# Patient Record
Sex: FEMALE | Race: WHITE | NOT HISPANIC OR LATINO | Employment: FULL TIME | URBAN - NONMETROPOLITAN AREA
[De-identification: names, ages, dates, MRNs, and addresses within clinical notes are randomized per-mention and may not be internally consistent; named-entity substitution may affect disease eponyms.]

---

## 2018-03-08 ENCOUNTER — TRANSCRIBE ORDERS (OUTPATIENT)
Dept: ADMINISTRATIVE | Facility: HOSPITAL | Age: 33
End: 2018-03-08

## 2018-03-08 DIAGNOSIS — Z12.39 SCREENING BREAST EXAMINATION: Primary | ICD-10-CM

## 2018-04-05 ENCOUNTER — HOSPITAL ENCOUNTER (OUTPATIENT)
Dept: MAMMOGRAPHY | Facility: HOSPITAL | Age: 33
Discharge: HOME | End: 2018-04-05
Admitting: ADVANCED PRACTICE MIDWIFE

## 2018-04-05 DIAGNOSIS — Z12.39 SCREENING BREAST EXAMINATION: ICD-10-CM

## 2018-04-05 PROCEDURE — 77063 BREAST TOMOSYNTHESIS BI: CPT

## 2018-04-05 PROCEDURE — 77067 SCR MAMMO BI INCL CAD: CPT

## 2018-04-05 PROCEDURE — 77063 BREAST TOMOSYNTHESIS BI: CPT | Performed by: RADIOLOGY

## 2018-04-05 PROCEDURE — 77067 SCR MAMMO BI INCL CAD: CPT | Performed by: RADIOLOGY

## 2018-04-11 ENCOUNTER — HOSPITAL ENCOUNTER (OUTPATIENT)
Dept: ULTRASOUND IMAGING | Facility: HOSPITAL | Age: 33
Discharge: HOME | End: 2018-04-11

## 2018-04-11 ENCOUNTER — HOSPITAL ENCOUNTER (OUTPATIENT)
Dept: MAMMOGRAPHY | Facility: HOSPITAL | Age: 33
Discharge: HOME | End: 2018-04-11
Admitting: RADIOLOGY

## 2018-04-11 DIAGNOSIS — R92.8 ABNORMAL MAMMOGRAM: ICD-10-CM

## 2018-04-11 PROCEDURE — G0279 TOMOSYNTHESIS, MAMMO: HCPCS

## 2018-04-11 PROCEDURE — 76642 ULTRASOUND BREAST LIMITED: CPT

## 2018-04-11 PROCEDURE — 77065 DX MAMMO INCL CAD UNI: CPT | Performed by: RADIOLOGY

## 2018-04-11 PROCEDURE — 76642 ULTRASOUND BREAST LIMITED: CPT | Performed by: RADIOLOGY

## 2018-04-11 PROCEDURE — 77061 BREAST TOMOSYNTHESIS UNI: CPT | Performed by: RADIOLOGY

## 2018-04-11 PROCEDURE — 77065 DX MAMMO INCL CAD UNI: CPT

## 2018-04-12 ENCOUNTER — APPOINTMENT (OUTPATIENT)
Dept: MAMMOGRAPHY | Facility: HOSPITAL | Age: 33
End: 2018-04-12

## 2018-10-16 ENCOUNTER — HOSPITAL ENCOUNTER (OUTPATIENT)
Dept: MAMMOGRAPHY | Facility: HOSPITAL | Age: 33
Discharge: HOME | End: 2018-10-16
Admitting: INTERNAL MEDICINE

## 2018-10-16 DIAGNOSIS — N64.89 OTHER SPECIFIED DISORDERS OF BREAST (CODE): ICD-10-CM

## 2018-10-16 PROCEDURE — 77061 BREAST TOMOSYNTHESIS UNI: CPT | Performed by: RADIOLOGY

## 2018-10-16 PROCEDURE — 77065 DX MAMMO INCL CAD UNI: CPT

## 2018-10-16 PROCEDURE — 77065 DX MAMMO INCL CAD UNI: CPT | Performed by: RADIOLOGY

## 2018-10-16 PROCEDURE — G0279 TOMOSYNTHESIS, MAMMO: HCPCS

## 2019-04-17 ENCOUNTER — HOSPITAL ENCOUNTER (OUTPATIENT)
Dept: MAMMOGRAPHY | Facility: HOSPITAL | Age: 34
Discharge: HOME | End: 2019-04-17
Admitting: INTERNAL MEDICINE

## 2019-04-17 DIAGNOSIS — R92.8 ABNORMAL MAMMOGRAM: ICD-10-CM

## 2019-04-17 PROCEDURE — 77062 BREAST TOMOSYNTHESIS BI: CPT | Performed by: RADIOLOGY

## 2019-04-17 PROCEDURE — 77066 DX MAMMO INCL CAD BI: CPT | Performed by: RADIOLOGY

## 2019-04-17 PROCEDURE — G0279 TOMOSYNTHESIS, MAMMO: HCPCS

## 2019-04-17 PROCEDURE — 77066 DX MAMMO INCL CAD BI: CPT

## 2020-08-31 ENCOUNTER — HOSPITAL ENCOUNTER (OUTPATIENT)
Dept: MAMMOGRAPHY | Facility: HOSPITAL | Age: 35
Discharge: HOME | End: 2020-08-31
Admitting: FAMILY MEDICINE

## 2020-08-31 DIAGNOSIS — Z12.31 VISIT FOR SCREENING MAMMOGRAM: ICD-10-CM

## 2020-08-31 PROCEDURE — 77067 SCR MAMMO BI INCL CAD: CPT | Performed by: RADIOLOGY

## 2020-08-31 PROCEDURE — 77063 BREAST TOMOSYNTHESIS BI: CPT | Performed by: RADIOLOGY

## 2020-08-31 PROCEDURE — 77063 BREAST TOMOSYNTHESIS BI: CPT

## 2020-08-31 PROCEDURE — 77067 SCR MAMMO BI INCL CAD: CPT

## 2021-03-23 ENCOUNTER — BULK ORDERING (OUTPATIENT)
Dept: CASE MANAGEMENT | Facility: OTHER | Age: 36
End: 2021-03-23

## 2021-03-23 DIAGNOSIS — Z23 IMMUNIZATION DUE: ICD-10-CM

## 2021-03-27 ENCOUNTER — APPOINTMENT (OUTPATIENT)
Dept: GENERAL RADIOLOGY | Facility: HOSPITAL | Age: 36
End: 2021-03-27

## 2021-03-27 ENCOUNTER — HOSPITAL ENCOUNTER (EMERGENCY)
Facility: HOSPITAL | Age: 36
Discharge: HOME | End: 2021-03-27
Attending: EMERGENCY MEDICINE | Admitting: EMERGENCY MEDICINE

## 2021-03-27 VITALS
TEMPERATURE: 98.2 F | WEIGHT: 178 LBS | HEIGHT: 65 IN | HEART RATE: 84 BPM | OXYGEN SATURATION: 94 % | RESPIRATION RATE: 16 BRPM | BODY MASS INDEX: 29.66 KG/M2 | DIASTOLIC BLOOD PRESSURE: 80 MMHG | SYSTOLIC BLOOD PRESSURE: 122 MMHG

## 2021-03-27 DIAGNOSIS — R03.0 ELEVATED BLOOD PRESSURE READING WITHOUT DIAGNOSIS OF HYPERTENSION: ICD-10-CM

## 2021-03-27 DIAGNOSIS — R55 NEAR SYNCOPE: ICD-10-CM

## 2021-03-27 DIAGNOSIS — R00.2 PALPITATIONS: Primary | ICD-10-CM

## 2021-03-27 LAB
ALBUMIN SERPL-MCNC: 4.5 G/DL (ref 3.5–5.2)
ALBUMIN/GLOB SERPL: 1.6 G/DL
ALP SERPL-CCNC: 71 U/L (ref 39–117)
ALT SERPL W P-5'-P-CCNC: 25 U/L (ref 1–33)
ANION GAP SERPL CALCULATED.3IONS-SCNC: 10 MMOL/L (ref 5–15)
AST SERPL-CCNC: 20 U/L (ref 1–32)
B-HCG UR QL: NEGATIVE
BASOPHILS # BLD AUTO: 0.06 10*3/MM3 (ref 0–0.2)
BASOPHILS NFR BLD AUTO: 0.6 % (ref 0–1.5)
BILIRUB SERPL-MCNC: 0.2 MG/DL (ref 0–1.2)
BUN SERPL-MCNC: 13 MG/DL (ref 6–20)
BUN/CREAT SERPL: 17.1 (ref 7–25)
CALCIUM SPEC-SCNC: 8.8 MG/DL (ref 8.6–10.5)
CHLORIDE SERPL-SCNC: 105 MMOL/L (ref 98–107)
CO2 SERPL-SCNC: 23 MMOL/L (ref 22–29)
CREAT SERPL-MCNC: 0.76 MG/DL (ref 0.57–1)
DEPRECATED RDW RBC AUTO: 39.7 FL (ref 37–54)
EOSINOPHIL # BLD AUTO: 0.08 10*3/MM3 (ref 0–0.4)
EOSINOPHIL NFR BLD AUTO: 0.8 % (ref 0.3–6.2)
ERYTHROCYTE [DISTWIDTH] IN BLOOD BY AUTOMATED COUNT: 13.7 % (ref 12.3–15.4)
GFR SERPL CREATININE-BSD FRML MDRD: 85 ML/MIN/1.73
GLOBULIN UR ELPH-MCNC: 2.8 GM/DL
GLUCOSE SERPL-MCNC: 128 MG/DL (ref 65–99)
HCT VFR BLD AUTO: 40.1 % (ref 34–46.6)
HGB BLD-MCNC: 12.6 G/DL (ref 12–15.9)
HOLD SPECIMEN: NORMAL
IMM GRANULOCYTES # BLD AUTO: 0.04 10*3/MM3 (ref 0–0.05)
IMM GRANULOCYTES NFR BLD AUTO: 0.4 % (ref 0–0.5)
INTERNAL NEGATIVE CONTROL: NEGATIVE
INTERNAL POSITIVE CONTROL: POSITIVE
LYMPHOCYTES # BLD AUTO: 1.96 10*3/MM3 (ref 0.7–3.1)
LYMPHOCYTES NFR BLD AUTO: 20.3 % (ref 19.6–45.3)
Lab: NORMAL
MAGNESIUM SERPL-MCNC: 2.2 MG/DL (ref 1.6–2.6)
MCH RBC QN AUTO: 25.4 PG (ref 26.6–33)
MCHC RBC AUTO-ENTMCNC: 31.4 G/DL (ref 31.5–35.7)
MCV RBC AUTO: 80.8 FL (ref 79–97)
MONOCYTES # BLD AUTO: 0.85 10*3/MM3 (ref 0.1–0.9)
MONOCYTES NFR BLD AUTO: 8.8 % (ref 5–12)
NEUTROPHILS NFR BLD AUTO: 6.67 10*3/MM3 (ref 1.7–7)
NEUTROPHILS NFR BLD AUTO: 69.1 % (ref 42.7–76)
NRBC BLD AUTO-RTO: 0 /100 WBC (ref 0–0.2)
NT-PROBNP SERPL-MCNC: 6.4 PG/ML (ref 0–450)
PLATELET # BLD AUTO: 294 10*3/MM3 (ref 140–450)
PMV BLD AUTO: 9.7 FL (ref 6–12)
POTASSIUM SERPL-SCNC: 3.8 MMOL/L (ref 3.5–5.2)
PROT SERPL-MCNC: 7.3 G/DL (ref 6–8.5)
QT INTERVAL: 362 MS
QTC INTERVAL: 407 MS
RBC # BLD AUTO: 4.96 10*6/MM3 (ref 3.77–5.28)
SODIUM SERPL-SCNC: 138 MMOL/L (ref 136–145)
TROPONIN T SERPL-MCNC: <0.01 NG/ML (ref 0–0.03)
TSH SERPL DL<=0.05 MIU/L-ACNC: 4.03 UIU/ML (ref 0.27–4.2)
WBC # BLD AUTO: 9.66 10*3/MM3 (ref 3.4–10.8)
WHOLE BLOOD HOLD SPECIMEN: NORMAL
WHOLE BLOOD HOLD SPECIMEN: NORMAL

## 2021-03-27 PROCEDURE — 93005 ELECTROCARDIOGRAM TRACING: CPT | Performed by: EMERGENCY MEDICINE

## 2021-03-27 PROCEDURE — 71045 X-RAY EXAM CHEST 1 VIEW: CPT

## 2021-03-27 PROCEDURE — 83735 ASSAY OF MAGNESIUM: CPT

## 2021-03-27 PROCEDURE — 80053 COMPREHEN METABOLIC PANEL: CPT

## 2021-03-27 PROCEDURE — 85025 COMPLETE CBC W/AUTO DIFF WBC: CPT

## 2021-03-27 PROCEDURE — 84443 ASSAY THYROID STIM HORMONE: CPT | Performed by: EMERGENCY MEDICINE

## 2021-03-27 PROCEDURE — 81025 URINE PREGNANCY TEST: CPT | Performed by: EMERGENCY MEDICINE

## 2021-03-27 PROCEDURE — 93005 ELECTROCARDIOGRAM TRACING: CPT

## 2021-03-27 PROCEDURE — 84484 ASSAY OF TROPONIN QUANT: CPT | Performed by: EMERGENCY MEDICINE

## 2021-03-27 PROCEDURE — 99284 EMERGENCY DEPT VISIT MOD MDM: CPT

## 2021-03-27 PROCEDURE — 83880 ASSAY OF NATRIURETIC PEPTIDE: CPT | Performed by: EMERGENCY MEDICINE

## 2021-03-27 RX ORDER — SODIUM CHLORIDE 0.9 % (FLUSH) 0.9 %
10 SYRINGE (ML) INJECTION AS NEEDED
Status: DISCONTINUED | OUTPATIENT
Start: 2021-03-27 | End: 2021-03-27 | Stop reason: HOSPADM

## 2021-11-09 ENCOUNTER — HOSPITAL ENCOUNTER (OUTPATIENT)
Dept: MAMMOGRAPHY | Facility: HOSPITAL | Age: 36
Discharge: HOME | End: 2021-11-09
Admitting: INTERNAL MEDICINE

## 2021-11-09 DIAGNOSIS — Z12.31 VISIT FOR SCREENING MAMMOGRAM: ICD-10-CM

## 2021-11-09 PROCEDURE — 77063 BREAST TOMOSYNTHESIS BI: CPT | Performed by: RADIOLOGY

## 2021-11-09 PROCEDURE — 77067 SCR MAMMO BI INCL CAD: CPT | Performed by: RADIOLOGY

## 2021-11-09 PROCEDURE — 77063 BREAST TOMOSYNTHESIS BI: CPT

## 2021-11-09 PROCEDURE — 77067 SCR MAMMO BI INCL CAD: CPT

## 2024-07-09 ENCOUNTER — OFFICE VISIT (OUTPATIENT)
Dept: PULMONOLOGY | Facility: CLINIC | Age: 39
End: 2024-07-09
Payer: COMMERCIAL

## 2024-07-09 VITALS
SYSTOLIC BLOOD PRESSURE: 132 MMHG | BODY MASS INDEX: 27.67 KG/M2 | TEMPERATURE: 97.5 F | DIASTOLIC BLOOD PRESSURE: 86 MMHG | HEART RATE: 111 BPM | HEIGHT: 65 IN | WEIGHT: 166.1 LBS | OXYGEN SATURATION: 99 %

## 2024-07-09 DIAGNOSIS — R91.8 MULTIPLE PULMONARY NODULES: Primary | ICD-10-CM

## 2024-07-09 PROCEDURE — 99203 OFFICE O/P NEW LOW 30 MIN: CPT | Performed by: INTERNAL MEDICINE

## 2024-07-09 RX ORDER — NITROFURANTOIN 25; 75 MG/1; MG/1
1 CAPSULE ORAL EVERY 12 HOURS SCHEDULED
COMMUNITY
Start: 2024-06-17

## 2024-07-09 RX ORDER — AZITHROMYCIN 250 MG/1
TABLET, FILM COATED ORAL
COMMUNITY
Start: 2024-04-10

## 2024-07-09 RX ORDER — CLINDAMYCIN HYDROCHLORIDE 300 MG/1
CAPSULE ORAL
COMMUNITY
Start: 2024-03-13

## 2024-07-09 RX ORDER — PROPRANOLOL HYDROCHLORIDE 10 MG/1
TABLET ORAL
COMMUNITY
Start: 2024-07-03

## 2024-07-09 RX ORDER — FAMOTIDINE 20 MG/1
20 TABLET, FILM COATED ORAL
COMMUNITY
Start: 2024-01-13 | End: 2025-01-12

## 2024-07-09 RX ORDER — ESCITALOPRAM OXALATE 5 MG
TABLET ORAL
COMMUNITY
Start: 2024-07-04

## 2024-07-09 RX ORDER — CLONAZEPAM 0.5 MG/1
TABLET ORAL
COMMUNITY
Start: 2024-06-30

## 2024-07-09 NOTE — PROGRESS NOTES
"Chief Complaint  Lung Nodule    Nasrin Celis is a 39 y.o. female who presents today to [facility] for Lung Nodule.    HPI:   Patient is a 39-year-old female who is referred to me for further evaluation and management of pulmonary nodules which were an incidental finding.  She had a CT angiogram of the chest and abdomen to rule out aortic dissection.  CT of the chest showed 4 mm nodule in the lingula, 3 mm nodule in right middle lobe.    She never smoked.  No family history of lung cancer.  She denies joint stiffness, photosensitivity, rash.    She denies cough, shortness of breath, hemoptysis, fever, night sweats, unintentional weight loss.    She never had pneumonia in the past.  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        Previous History:   History reviewed. No pertinent past medical history.   History reviewed. No pertinent surgical history.   Social History     Socioeconomic History    Marital status:    Tobacco Use    Smoking status: Never    Smokeless tobacco: Never        Current Medications:  Current Outpatient Medications   Medication Sig Dispense Refill    azithromycin (ZITHROMAX) 250 MG tablet       clindamycin (CLEOCIN) 300 MG capsule       clonazePAM (KlonoPIN) 0.5 MG tablet TAKE 1/2 (ONE-HALF) TABLET BY MOUTH TWICE DAILY AS NEEDED FOR ANXIETY      famotidine (PEPCID) 20 MG tablet Take 1 tablet by mouth.      Lexapro 5 MG tablet       nitrofurantoin, macrocrystal-monohydrate, (MACROBID) 100 MG capsule Take 1 capsule by mouth Every 12 (Twelve) Hours.      propranolol (INDERAL) 10 MG tablet TAKE 1/2 TO 1 TABLET BY MOUTH TWICE DAILY AS NEEDED FOR PANIC/ANXIETY SYMPTOMS. --MONITOR BLOOD PRESSURE AND HEART RATE       No current facility-administered medications for this visit.       Allergies:   Allergies   Allergen Reactions    Cephalosporins Rash     \" I PASSED OUT TOO\"        Vitals:   /86   Pulse 111   Temp 97.5 °F (36.4 °C)   Ht " "165.1 cm (65\")   Wt 75.3 kg (166 lb 1.6 oz)   SpO2 99%   BMI 27.64 kg/m²     Estimated body mass index is 27.64 kg/m² as calculated from the following:    Height as of this encounter: 165.1 cm (65\").    Weight as of this encounter: 75.3 kg (166 lb 1.6 oz).             Physical Exam:   Physical Exam  Vitals reviewed.   Constitutional:       Appearance: Normal appearance. She is obese.      Interventions: She is not intubated.  HENT:      Head: Normocephalic.      Nose: Nose normal.      Mouth/Throat:      Mouth: Mucous membranes are moist.   Eyes:      Extraocular Movements: Extraocular movements intact.      Conjunctiva/sclera: Conjunctivae normal.      Pupils: Pupils are equal, round, and reactive to light.   Cardiovascular:      Rate and Rhythm: Normal rate.      Heart sounds: No murmur heard.     No friction rub. No gallop.   Pulmonary:      Effort: Pulmonary effort is normal. No tachypnea, bradypnea, accessory muscle usage, prolonged expiration, respiratory distress or retractions. She is not intubated.      Breath sounds: Normal breath sounds. No stridor, decreased air movement or transmitted upper airway sounds.   Abdominal:      General: There is no distension.      Palpations: Abdomen is soft. There is no mass.      Tenderness: There is no abdominal tenderness. There is no right CVA tenderness, left CVA tenderness, guarding or rebound.      Hernia: No hernia is present.   Skin:     Coloration: Skin is not jaundiced.      Findings: No erythema.   Neurological:      General: No focal deficit present.      Mental Status: She is alert and oriented to person, place, and time.   Psychiatric:         Mood and Affect: Mood normal.               STOP-Bang Score:     [place] Sleepiness Scale: [place] Sleepiness Scale  Sitting and reading: Would never doze  Watching TV: Would never doze  Sitting, inactive in a public place (e.g. a theatre or a meeting): Would never doze  As a passenger in a car for an hour without a " break: Would never doze  Lying down to rest in the afternoon when circumstances permit: Would never doze  Sitting and talking to someone: Would never doze  Sitting quietly after a lunch without alcohol: Would never doze  In a car, while stopped for a few minutes in traffic: Would never doze  Total score: 0     Lab Results:   No visits with results within 3 Month(s) from this visit.   Latest known visit with results is:   Admission on 03/27/2021, Discharged on 03/27/2021   Component Date Value Ref Range Status    QT Interval 03/27/2021 362  ms Final    QTC Interval 03/27/2021 407  ms Final    Glucose 03/27/2021 128 (H)  65 - 99 mg/dL Final    BUN 03/27/2021 13  6 - 20 mg/dL Final    Creatinine 03/27/2021 0.76  0.57 - 1.00 mg/dL Final    Sodium 03/27/2021 138  136 - 145 mmol/L Final    Potassium 03/27/2021 3.8  3.5 - 5.2 mmol/L Final    Chloride 03/27/2021 105  98 - 107 mmol/L Final    CO2 03/27/2021 23.0  22.0 - 29.0 mmol/L Final    Calcium 03/27/2021 8.8  8.6 - 10.5 mg/dL Final    Total Protein 03/27/2021 7.3  6.0 - 8.5 g/dL Final    Albumin 03/27/2021 4.50  3.50 - 5.20 g/dL Final    ALT (SGPT) 03/27/2021 25  1 - 33 U/L Final    AST (SGOT) 03/27/2021 20  1 - 32 U/L Final    Alkaline Phosphatase 03/27/2021 71  39 - 117 U/L Final    Total Bilirubin 03/27/2021 0.2  0.0 - 1.2 mg/dL Final    eGFR Non  Amer 03/27/2021 85  >60 mL/min/1.73 Final    Globulin 03/27/2021 2.8  gm/dL Final    A/G Ratio 03/27/2021 1.6  g/dL Final    BUN/Creatinine Ratio 03/27/2021 17.1  7.0 - 25.0 Final    Anion Gap 03/27/2021 10.0  5.0 - 15.0 mmol/L Final    Magnesium 03/27/2021 2.2  1.6 - 2.6 mg/dL Final    Troponin T 03/27/2021 <0.010  0.000 - 0.030 ng/mL Final    TSH 03/27/2021 4.030  0.270 - 4.200 uIU/mL Final    proBNP 03/27/2021 6.4  0.0 - 450.0 pg/mL Final    HCG, Urine, QL 03/27/2021 Negative  Negative Final    Lot Number 03/27/2021 [id number]   Final    Internal Positive Control 03/27/2021 Positive   Final    Internal Negative  "Control 03/27/2021 Negative   Final    Extra Tube 03/27/2021 hold for add-on   Final    Auto resulted    Extra Tube 03/27/2021 Hold for add-ons.   Final    Auto resulted.    Extra Tube 03/27/2021 hold for add-on   Final    Auto resulted    Extra Tube 03/27/2021 Hold for add-ons.   Final    Auto resulted.    Extra Tube 03/27/2021 Hold for add-ons.   Final    Auto resulted.    WBC 03/27/2021 9.66  3.40 - 10.80 10*3/mm3 Final    RBC 03/27/2021 4.96  3.77 - 5.28 10*6/mm3 Final    Hemoglobin 03/27/2021 12.6  12.0 - 15.9 g/dL Final    Hematocrit 03/27/2021 40.1  34.0 - 46.6 % Final    MCV 03/27/2021 80.8  79.0 - 97.0 fL Final    MCH 03/27/2021 25.4 (L)  26.6 - 33.0 pg Final    MCHC 03/27/2021 31.4 (L)  31.5 - 35.7 g/dL Final    RDW 03/27/2021 13.7  12.3 - 15.4 % Final    RDW-SD 03/27/2021 39.7  37.0 - 54.0 fl Final    MPV 03/27/2021 9.7  6.0 - 12.0 fL Final    Platelets 03/27/2021 294  140 - 450 10*3/mm3 Final    Neutrophil % 03/27/2021 69.1  42.7 - 76.0 % Final    Lymphocyte % 03/27/2021 20.3  19.6 - 45.3 % Final    Monocyte % 03/27/2021 8.8  5.0 - 12.0 % Final    Eosinophil % 03/27/2021 0.8  0.3 - 6.2 % Final    Basophil % 03/27/2021 0.6  0.0 - 1.5 % Final    Immature Grans % 03/27/2021 0.4  0.0 - 0.5 % Final    Neutrophils, Absolute 03/27/2021 6.67  1.70 - 7.00 10*3/mm3 Final    Lymphocytes, Absolute 03/27/2021 1.96  0.70 - 3.10 10*3/mm3 Final    Monocytes, Absolute 03/27/2021 0.85  0.10 - 0.90 10*3/mm3 Final    Eosinophils, Absolute 03/27/2021 0.08  0.00 - 0.40 10*3/mm3 Final    Basophils, Absolute 03/27/2021 0.06  0.00 - 0.20 10*3/mm3 Final    Immature Grans, Absolute 03/27/2021 0.04  0.00 - 0.05 10*3/mm3 Final    nRBC 03/27/2021 0.0  0.0 - 0.2 /100 WBC Final       Lab Results (last 72 hours)       ** No results found for the last 72 hours. **          WBC No results found for: \"WBC\"   HGB No results found for: \"HGB\"   HCT No results found for: \"HCT\"   Platlets No results found for: \"LABPLAT\"     PT/INR:  No results " "found for: \"PROTIME\"/No results found for: \"INR\"    Sodium No results found for: \"NA\"   Potassium No results found for: \"K\"   Chloride No results found for: \"CL\"   Bicarbonate No results found for: \"PLASMABICARB\"   BUN No results found for: \"BUN\"   Creatinine No results found for: \"CREATININE\"   Calcium No results found for: \"CALCIUM\"   Magnesium No results found for: \"MG\"     pH No results found for: \"PHART\"   pO2 No results found for: \"PO2ART\"   pCO2 No results found for: \"WYW9IBF\"   HCO3 No results found for: \"UVV3TOJ\"           Radiology Review:   Results for orders placed during the hospital encounter of 03/27/21    XR Chest 1 View    Narrative  PROCEDURE: CR Chest 1 Vw    COMPARISON:  No relevant comparison or correlation studies available at time of dictation.    INDICATIONS: Dysrhythmia triage protocol  Relevant clinical info: Heart palpitations x1 day  TECHNIQUE: Single AP  view of the chest    FINDINGS:  Cardiomediastinal silhouette is within normal limits given projection.  Lungs are relatively well inflated and clear. Osseous structures are intact.    Impression  No acute disease.        Signer Name: Alma Rosa Heller MD  Signed: 3/27/2021 8:27 PM  Workstation Name: [facility]  [facility]     No results found for this or any previous visit.    No results found for this or any previous visit.    No results found for this or any previous visit.    No results found for this or any previous visit.    No results found for this or any previous visit.     No results found for this or any previous visit.    No results found for this or any previous visit.    No results found for this or any previous visit.                  Results Review: I reviewed the patient's new clinical results.    Assessment and Plan    Visit Diagnosis:     ICD-10-CM ICD-9-CM   1. Multiple pulmonary nodules  R91.8 793.19   3 and 4 mm nodule in right middle lobe and lingula respectively.  Low risk patient from " malignancy standpoint.    No further workup needed.  I have advised patient to get back to me if she develops cough, unintentional weight loss, night sweats, hemoptysis and worsening shortness of breath.  Nodules are most likely old healed granuloma     New Medications:   No orders of the defined types were placed in this encounter.      Discontinued Medications:   There are no discontinued medications.         Follow Up:   As needed    Patient was given instructions and counseling regarding her condition. Please see specific information pulled into the AVS if appropriate.       This document has been electronically signed by To Bush MD   July 9, 2024 14:53 EDT    Dictated Utilizing Dragon Dictation: Part of this note may be an electronic transcription/translation of spoken language to printed text using the Dragon Dictation System.

## 2024-07-12 ENCOUNTER — PATIENT ROUNDING (BHMG ONLY) (OUTPATIENT)
Dept: PULMONOLOGY | Facility: CLINIC | Age: 39
End: 2024-07-12
Payer: COMMERCIAL

## 2024-07-12 NOTE — PROGRESS NOTES
July 12, 2024    Hello, may I speak with Nasrin Celis?    My name is Nasrin      I am  with [facility]  [address]  [place] [address] [phone].    Before we get started may I verify your date of birth? 1985    I am calling to officially welcome you to our practice and ask about your recent visit. Is this a good time to talk? yes    Tell me about your visit with us. What things went well?  Very quick, doctor seemed very knowledgeable and the staff were very nice.         We're always looking for ways to make our patients' experiences even better. Do you have recommendations on ways we may improve?  no    Overall were you satisfied with your first visit to our practice? yes       I appreciate you taking the time to speak with me today. Is there anything else I can do for you? no      Thank you, and have a great day.